# Patient Record
Sex: FEMALE | Race: WHITE | Employment: UNEMPLOYED | URBAN - METROPOLITAN AREA
[De-identification: names, ages, dates, MRNs, and addresses within clinical notes are randomized per-mention and may not be internally consistent; named-entity substitution may affect disease eponyms.]

---

## 2017-01-10 ENCOUNTER — TELEPHONE (OUTPATIENT)
Dept: ORTHOPEDIC SURGERY | Age: 82
End: 2017-01-10

## 2017-01-11 ENCOUNTER — TELEPHONE (OUTPATIENT)
Dept: ORTHOPEDIC SURGERY | Age: 82
End: 2017-01-11

## 2017-02-13 ENCOUNTER — TELEPHONE (OUTPATIENT)
Dept: ORTHOPEDIC SURGERY | Age: 82
End: 2017-02-13

## 2017-03-01 ENCOUNTER — ORTHOTIC/BRACE ENCOUNTER (OUTPATIENT)
Dept: ORTHOPEDIC SURGERY | Age: 82
End: 2017-03-01

## 2017-06-22 ENCOUNTER — NURSE ONLY (OUTPATIENT)
Age: 82
End: 2017-06-22

## 2017-06-22 DIAGNOSIS — M81.0 OSTEOPOROSIS, POSTMENOPAUSAL: Primary | ICD-10-CM

## 2017-07-06 ENCOUNTER — NURSE ONLY (OUTPATIENT)
Age: 82
End: 2017-07-06

## 2017-07-06 PROCEDURE — 96372 THER/PROPH/DIAG INJ SC/IM: CPT | Performed by: INTERNAL MEDICINE

## 2017-12-26 ENCOUNTER — TELEPHONE (OUTPATIENT)
Dept: ENDOCRINOLOGY | Age: 82
End: 2017-12-26

## 2018-02-16 ENCOUNTER — OFFICE VISIT (OUTPATIENT)
Dept: ORTHOPEDIC SURGERY | Age: 83
End: 2018-02-16

## 2018-02-16 DIAGNOSIS — M79.602 PAIN OF LEFT UPPER EXTREMITY: Primary | ICD-10-CM

## 2018-02-16 PROCEDURE — 4040F PNEUMOC VAC/ADMIN/RCVD: CPT | Performed by: ORTHOPAEDIC SURGERY

## 2018-02-16 PROCEDURE — G8427 DOCREV CUR MEDS BY ELIG CLIN: HCPCS | Performed by: ORTHOPAEDIC SURGERY

## 2018-02-16 PROCEDURE — 1036F TOBACCO NON-USER: CPT | Performed by: ORTHOPAEDIC SURGERY

## 2018-02-16 PROCEDURE — G8484 FLU IMMUNIZE NO ADMIN: HCPCS | Performed by: ORTHOPAEDIC SURGERY

## 2018-02-16 PROCEDURE — G8421 BMI NOT CALCULATED: HCPCS | Performed by: ORTHOPAEDIC SURGERY

## 2018-02-16 PROCEDURE — 20610 DRAIN/INJ JOINT/BURSA W/O US: CPT | Performed by: ORTHOPAEDIC SURGERY

## 2018-02-16 PROCEDURE — 99214 OFFICE O/P EST MOD 30 MIN: CPT | Performed by: ORTHOPAEDIC SURGERY

## 2018-02-16 PROCEDURE — 1123F ACP DISCUSS/DSCN MKR DOCD: CPT | Performed by: ORTHOPAEDIC SURGERY

## 2018-02-16 PROCEDURE — 1090F PRES/ABSN URINE INCON ASSESS: CPT | Performed by: ORTHOPAEDIC SURGERY

## 2018-03-05 ENCOUNTER — TELEPHONE (OUTPATIENT)
Age: 83
End: 2018-03-05

## 2018-03-05 NOTE — TELEPHONE ENCOUNTER
Pt called and is sick so she canceled her apt for 3/5/2018 . I called and canceled her dexa as well.  She said she will call back and reschedule when she feels better

## 2018-03-19 ENCOUNTER — TELEPHONE (OUTPATIENT)
Age: 83
End: 2018-03-19

## 2018-03-19 NOTE — TELEPHONE ENCOUNTER
Pt needs scheduled in for 4/9/2018 245 it will not let me schedule I will put in for her dexa thanks

## 2018-04-03 DIAGNOSIS — M81.0 OSTEOPOROSIS, POSTMENOPAUSAL: Primary | ICD-10-CM

## 2018-04-09 ENCOUNTER — TELEPHONE (OUTPATIENT)
Dept: ENDOCRINOLOGY | Age: 83
End: 2018-04-09

## 2018-05-14 ENCOUNTER — OFFICE VISIT (OUTPATIENT)
Age: 83
End: 2018-05-14

## 2018-05-14 ENCOUNTER — HOSPITAL ENCOUNTER (OUTPATIENT)
Dept: GENERAL RADIOLOGY | Age: 83
Discharge: HOME | End: 2018-05-14
Attending: INTERNAL MEDICINE | Admitting: INTERNAL MEDICINE

## 2018-05-14 ENCOUNTER — PROCEDURE VISIT (OUTPATIENT)
Age: 83
End: 2018-05-14

## 2018-05-14 VITALS
BODY MASS INDEX: 18.49 KG/M2 | WEIGHT: 94.2 LBS | HEIGHT: 60 IN | SYSTOLIC BLOOD PRESSURE: 130 MMHG | DIASTOLIC BLOOD PRESSURE: 80 MMHG

## 2018-05-14 DIAGNOSIS — M81.0 OSTEOPOROSIS, POSTMENOPAUSAL: Primary | ICD-10-CM

## 2018-05-14 DIAGNOSIS — M81.0 AGE-RELATED OSTEOPOROSIS WITHOUT CURRENT PATHOLOGICAL FRACTURE: ICD-10-CM

## 2018-05-14 DIAGNOSIS — M80.08XD FRACTURE OF VERTEBRA DUE TO OSTEOPOROSIS WITH ROUTINE HEALING, SUBSEQUENT ENCOUNTER: ICD-10-CM

## 2018-05-14 DIAGNOSIS — Z51.81 MEDICATION MONITORING ENCOUNTER: ICD-10-CM

## 2018-05-14 DIAGNOSIS — M81.0 OSTEOPOROSIS, POSTMENOPAUSAL: ICD-10-CM

## 2018-05-14 DIAGNOSIS — N18.30 CHRONIC KIDNEY DISEASE (CKD), STAGE III (MODERATE) (HCC): ICD-10-CM

## 2018-05-14 PROCEDURE — 1123F ACP DISCUSS/DSCN MKR DOCD: CPT | Performed by: INTERNAL MEDICINE

## 2018-05-14 PROCEDURE — G8419 CALC BMI OUT NRM PARAM NOF/U: HCPCS | Performed by: INTERNAL MEDICINE

## 2018-05-14 PROCEDURE — 1090F PRES/ABSN URINE INCON ASSESS: CPT | Performed by: INTERNAL MEDICINE

## 2018-05-14 PROCEDURE — 96372 THER/PROPH/DIAG INJ SC/IM: CPT | Performed by: INTERNAL MEDICINE

## 2018-05-14 PROCEDURE — 99214 OFFICE O/P EST MOD 30 MIN: CPT | Performed by: INTERNAL MEDICINE

## 2018-05-14 PROCEDURE — G8427 DOCREV CUR MEDS BY ELIG CLIN: HCPCS | Performed by: INTERNAL MEDICINE

## 2018-05-14 PROCEDURE — 77080 DXA BONE DENSITY AXIAL: CPT | Performed by: INTERNAL MEDICINE

## 2018-05-14 PROCEDURE — 4040F PNEUMOC VAC/ADMIN/RCVD: CPT | Performed by: INTERNAL MEDICINE

## 2018-05-14 PROCEDURE — 1036F TOBACCO NON-USER: CPT | Performed by: INTERNAL MEDICINE

## 2018-11-13 DIAGNOSIS — M81.0 OSTEOPOROSIS, POSTMENOPAUSAL: Primary | ICD-10-CM

## 2018-11-15 ENCOUNTER — TELEPHONE (OUTPATIENT)
Dept: ENDOCRINOLOGY | Age: 83
End: 2018-11-15

## 2018-11-15 ENCOUNTER — NURSE ONLY (OUTPATIENT)
Dept: ENDOCRINOLOGY | Age: 83
End: 2018-11-15
Payer: MEDICARE

## 2018-11-15 DIAGNOSIS — M81.0 OSTEOPOROSIS, POSTMENOPAUSAL: Primary | ICD-10-CM

## 2018-11-15 PROCEDURE — 96372 THER/PROPH/DIAG INJ SC/IM: CPT | Performed by: INTERNAL MEDICINE

## 2019-05-21 ENCOUNTER — PROCEDURE VISIT (OUTPATIENT)
Dept: ENDOCRINOLOGY | Age: 84
End: 2019-05-21
Payer: MEDICARE

## 2019-05-21 ENCOUNTER — OFFICE VISIT (OUTPATIENT)
Dept: ENDOCRINOLOGY | Age: 84
End: 2019-05-21
Payer: MEDICARE

## 2019-05-21 ENCOUNTER — HOSPITAL ENCOUNTER (OUTPATIENT)
Dept: GENERAL RADIOLOGY | Age: 84
Discharge: HOME | End: 2019-05-21
Payer: MEDICARE

## 2019-05-21 VITALS — WEIGHT: 93.6 LBS | BODY MASS INDEX: 18.16 KG/M2 | SYSTOLIC BLOOD PRESSURE: 118 MMHG | DIASTOLIC BLOOD PRESSURE: 63 MMHG

## 2019-05-21 DIAGNOSIS — M80.08XD FRACTURE OF VERTEBRA DUE TO OSTEOPOROSIS WITH ROUTINE HEALING, SUBSEQUENT ENCOUNTER: ICD-10-CM

## 2019-05-21 DIAGNOSIS — Z51.81 MEDICATION MONITORING ENCOUNTER: ICD-10-CM

## 2019-05-21 DIAGNOSIS — M81.0 OSTEOPOROSIS, POSTMENOPAUSAL: ICD-10-CM

## 2019-05-21 DIAGNOSIS — N18.30 CHRONIC KIDNEY DISEASE (CKD), STAGE III (MODERATE) (HCC): ICD-10-CM

## 2019-05-21 DIAGNOSIS — M81.0 OSTEOPOROSIS, POSTMENOPAUSAL: Primary | ICD-10-CM

## 2019-05-21 LAB — VITAMIN D 25-HYDROXY: 35.1 NG/ML

## 2019-05-21 PROCEDURE — 99214 OFFICE O/P EST MOD 30 MIN: CPT | Performed by: INTERNAL MEDICINE

## 2019-05-21 PROCEDURE — 1090F PRES/ABSN URINE INCON ASSESS: CPT | Performed by: INTERNAL MEDICINE

## 2019-05-21 PROCEDURE — 96372 THER/PROPH/DIAG INJ SC/IM: CPT | Performed by: INTERNAL MEDICINE

## 2019-05-21 PROCEDURE — 77080 DXA BONE DENSITY AXIAL: CPT | Performed by: INTERNAL MEDICINE

## 2019-05-21 PROCEDURE — 77080 DXA BONE DENSITY AXIAL: CPT

## 2019-05-21 PROCEDURE — 4040F PNEUMOC VAC/ADMIN/RCVD: CPT | Performed by: INTERNAL MEDICINE

## 2019-05-21 PROCEDURE — G8419 CALC BMI OUT NRM PARAM NOF/U: HCPCS | Performed by: INTERNAL MEDICINE

## 2019-05-21 PROCEDURE — G8427 DOCREV CUR MEDS BY ELIG CLIN: HCPCS | Performed by: INTERNAL MEDICINE

## 2019-05-21 PROCEDURE — 1123F ACP DISCUSS/DSCN MKR DOCD: CPT | Performed by: INTERNAL MEDICINE

## 2019-05-21 PROCEDURE — 1036F TOBACCO NON-USER: CPT | Performed by: INTERNAL MEDICINE

## 2019-05-21 NOTE — RESULT ENCOUNTER NOTE
[facility]) Osteoporosis and [address] [place] [address]., [address] [place] [address]   Phone [phone]  Fax [phone]    NAME: Jn Velez   : 10/22/1929   STUDY DATE: 2019     REFERRING PROVIDER: Sylvie Chinchilla MD    INDICATION(S) FOR PERFORMING THE STUDY:  osteoporosis, age-related (M81.0)    CLINICAL INFORMATION PROVIDED BY THE PATIENT: 57-year-old woman. She started natural menopause at age 50. She has had multiple vertebral fractures starting in . She requires corticosteroid therapy for ulcerative colitis (started ). She was treated with Evista ~8450-5799, Forteo 2011-2013. Current treatment is with Prolia started 2013. EQUIPMENT: [employer]. POSITIONING: Good. REGIONS OF INTEREST: Correct. ARTIFACTS: None. STUDY VALID? Yes. Spine BMD is spuriously high because of generalized degenerative change; L1 was deleted in  and L2 deleted starting in . T-scores  Initial study: 2011 spine L2-L4 -2.8 Lowest hip (left fem. neck) -2.6   Current study: 2019 spine L3-L4 -3.3 Lowest hip (left fem. neck) -3.0     The table below shows bone mineral density (grams/cm2), the appropriate measure for comparing serial scans An increase or decrease is significant based on precision studies done at our center according to the ISCD protocol. PA spine Proximal Femur (left)   Date L2-L4 fem. neck Trochanter Total hip   2001 NA 0.562 0.503 0.687   2011 Invalid 0.489 0.510 0.650   2012 0.745 0.459 0.465 0.611   2013 0.701 0.487 0.467 0.626   2015 0.758 0.487 0.488 0.632   10/25/2016 0.759 0.530 0.469 0.646   2018 0.747 0.491 0.456 0.683   2019 0.733 0.518 0.479 0.670      IMPRESSION:  BONE DENSITY IS LOW, CONSISTENT WITH OSTEOPOROSIS.   SINCE THE PREVIOUS DXA, BMD DID NOT CHANGE SIGNIFICANTLY IN THE SPINE OR LEFT HIP. COMPARED WITH 2013, BEFORE STARTING PROLIA, BMD IS HIGHER NOW IN THE SPINE, FEMORAL NECK AND TOTAL HIP. Consider repeating this study in 1-2 years to assess the patient's progress. _________________________________________________    Annette Sherwood MD, Director, [facility]) Osteoporosis and [address] [place] [address]

## 2019-05-21 NOTE — PROGRESS NOTES
[facility]) Physicians Osteoporosis and [address] [facility]  [address], [address]  Phone [phone]  Fax [phone]    PATIENT NAME: Arina Fenton OF BIRTH: 10/22/1929  INITIAL CONSULTATION: 11/02/2011  MOST RECENT VISIT: 05/14/2017  TODAYS VISIT: 05/21/2019     Labs @ [facility] 05/2018    PROBLEMS:     Osteoporosis by DXA 03/2001, lowest T-score -2.8 in the spine    Family history of osteoporosis, mother with spine fractures    T11 fracture 12/2008, kyphoplasty 01/2009, hospitalized within 48 hours with pericardial effusion    Atraumatic compression fractures at L1, L3 (08/2011)    Humerus fracture 05/2016    3\" shorter than stated young adult height    BMD decreased at the left hip [id number], lowest T-score -3.2 LFN  Natural menopause age 50 (1977)  Ulcerative colitis diagnosed ~01/2009, prednisone [id number]    Colectomy 02/2012, bowel obstruction 1/2013    GI bleed 06/2015  Hypertension  GERD, lansoprazole  Hyponatremia, 129 mmol/L 05/2015, probably due to HCTZ    CURRENT MANAGEMENT FOR BONE HEALTH:   Calcium: 720 mg/d diet + 500 mg/d supplements = 1220 mg/d  Vitamin D: 1000 IU/d with calcium    25-OH D 34 ng/mL 05/2008  Exercise: physical therapy exercises 3-4 x weekly  Pharmacologic therapy: Prolia 60 mg SQ twice yearly started 11/2013    PREVIOUS MEDICATIONS FOR OSTEOPOROSIS:    Evista ~ 4598-2563  Fosamax ~2002, severe GERD  Forteo 20 mcg SQ daily 11/2011-11/2013, finished 2-year course    OTHER CURRENT MEDICATIONS (SELECTED):  Losartan, Colazal, Prevacid, lorazepam, Flexeril, hydrocodone, HCTZ 25 mg/d  OTC MEDICATIONS:  Tylenol    CHIEF COMPLAINT: Here for followup visit for osteoporosis, vertebral fractures and corticosteroid use; monitoring treatment. No new related signs or symptoms. INTERVAL HISTORY:  See problem list for chronic/inactive conditions. She received Prolia without side effects. No falls, near-falls and fractures.  She feels well overall. FOR FULL DETAILS OF FAMILY HISTORY, PAST MEDICAL AND SURGICAL HISTORY, SOCIAL HISTORY, AND REVIEW OF SYSTEMS, SEE PATIENT QUESTIONNAIRE OF TODAY'S DATE. PHYSICAL EXAMINATION:  NEUROLOGIC EXAM: Not able to rise from chair without using arms. No apparent focal motor or sensory deficit. Reflexes brisk and symmetric. Coordination appears normal.  MUSCULOSKELETAL EXAM: using a wheelchair. Merle Tapia Spine: Kyphosis. No spine tenderness to palpation or percussion. Ribs and pelvis: Ribs appear normal. No space between ribs and pelvis. BONE DENSITY:  Most recent done here using Hologic equipment. T-scores  Initial study: 03/26/2011 spine L2-L4 -2.8 Lowest hip (left fem. neck) -2.6   Current study: 05/21/2019 spine L3-L4 -3.3 Lowest hip (left fem. neck) -3.0     The table below shows bone mineral density (grams/cm2), the appropriate measure for comparing serial scans An increase or decrease is significant based on precision studies done at our center according to the ISCD protocol. PA spine Proximal Femur (left)   Date L2-L4 fem. neck Trochanter Total hip   03/26/2001 NA 0.562 0.503 0.687   07/28/2011 Invalid 0.489 0.510 0.650   07/23/2012 0.745 0.459 0.465 0.611   07/29/2013 0.701 0.487 0.467 0.626   08/04/2015 0.758 0.487 0.488 0.632   10/25/2016 0.759 0.530 0.469 0.646   05/14/2018 0.747 0.491 0.456 0.683   05/21/2019 0.733 0.518 0.479 0.670      IMPRESSION:  BONE DENSITY IS LOW, CONSISTENT WITH OSTEOPOROSIS. SINCE THE PREVIOUS DXA, BMD DID NOT CHANGE SIGNIFICANTLY IN THE SPINE OR LEFT HIP. COMPARED WITH 2013, BEFORE STARTING PROLIA, BMD IS HIGHER NOW IN THE SPINE, FEMORAL NECK AND TOTAL HIP. LABS: 07/2012 BAP 9.4. 02/2013 Ca 9.8 Cr 0.9 BAP 13.0. 09/2013, Na 130, Ca 10.3, Cr 0.9, BAP 15.8.  05/2014 Na 129 Ca 10.6 Cr 0.7 alb 4.6. 05/2015 Ca 10.6 alb 4.6.  07/2015 Ca 9.8.   05/2016 Na 129 Ca 9.2 Cr 0.8. 01/2018 iCa 5.1 Cr 0.8. 03/2018 Ca 9.6 Cr 0.7. 05/2018 a 10.0 Cr 0.7.   X-rays viewed:  DXA

## 2019-12-04 ENCOUNTER — NURSE ONLY (OUTPATIENT)
Dept: ENDOCRINOLOGY | Age: 84
End: 2019-12-04
Payer: MEDICARE

## 2019-12-04 DIAGNOSIS — M81.0 OSTEOPOROSIS, POSTMENOPAUSAL: ICD-10-CM

## 2019-12-04 PROCEDURE — 96372 THER/PROPH/DIAG INJ SC/IM: CPT | Performed by: INTERNAL MEDICINE

## 2022-06-04 ENCOUNTER — TELEPHONE ENCOUNTER (OUTPATIENT)
Dept: URBAN - METROPOLITAN AREA CLINIC 68 | Facility: CLINIC | Age: 87
End: 2022-06-04

## 2022-06-05 ENCOUNTER — TELEPHONE ENCOUNTER (OUTPATIENT)
Dept: URBAN - METROPOLITAN AREA CLINIC 68 | Facility: CLINIC | Age: 87
End: 2022-06-05

## 2022-06-25 ENCOUNTER — TELEPHONE ENCOUNTER (OUTPATIENT)
Age: 87
End: 2022-06-25

## 2022-06-26 ENCOUNTER — TELEPHONE ENCOUNTER (OUTPATIENT)
Age: 87
End: 2022-06-26